# Patient Record
Sex: FEMALE | Race: WHITE | NOT HISPANIC OR LATINO | URBAN - METROPOLITAN AREA
[De-identification: names, ages, dates, MRNs, and addresses within clinical notes are randomized per-mention and may not be internally consistent; named-entity substitution may affect disease eponyms.]

---

## 2021-07-20 ENCOUNTER — EMERGENCY (EMERGENCY)
Facility: HOSPITAL | Age: 71
LOS: 1 days | End: 2021-07-20
Admitting: EMERGENCY MEDICINE
Payer: MEDICARE

## 2021-07-20 PROCEDURE — L9992: CPT

## 2021-08-03 ENCOUNTER — APPOINTMENT (OUTPATIENT)
Dept: MRI IMAGING | Facility: CLINIC | Age: 71
End: 2021-08-03
Payer: MEDICARE

## 2021-08-03 ENCOUNTER — OUTPATIENT (OUTPATIENT)
Dept: OUTPATIENT SERVICES | Facility: HOSPITAL | Age: 71
LOS: 1 days | End: 2021-08-03
Payer: MEDICARE

## 2021-08-03 DIAGNOSIS — D36.10 BENIGN NEOPLASM OF PERIPHERAL NERVES AND AUTONOMIC NERVOUS SYSTEM, UNSPECIFIED: ICD-10-CM

## 2021-08-03 PROCEDURE — 70553 MRI BRAIN STEM W/O & W/DYE: CPT | Mod: 26,MH

## 2021-08-03 PROCEDURE — 70553 MRI BRAIN STEM W/O & W/DYE: CPT | Mod: MH

## 2021-08-03 PROCEDURE — A9585: CPT

## 2021-08-13 ENCOUNTER — OUTPATIENT (OUTPATIENT)
Dept: OUTPATIENT SERVICES | Facility: HOSPITAL | Age: 71
LOS: 1 days | End: 2021-08-13

## 2021-08-18 ENCOUNTER — NON-APPOINTMENT (OUTPATIENT)
Age: 71
End: 2021-08-18

## 2021-08-18 ENCOUNTER — APPOINTMENT (OUTPATIENT)
Dept: CARDIOLOGY | Facility: CLINIC | Age: 71
End: 2021-08-18
Payer: MEDICARE

## 2021-08-18 VITALS
TEMPERATURE: 97.3 F | HEIGHT: 62 IN | DIASTOLIC BLOOD PRESSURE: 70 MMHG | HEART RATE: 61 BPM | SYSTOLIC BLOOD PRESSURE: 120 MMHG | OXYGEN SATURATION: 95 % | WEIGHT: 122 LBS | RESPIRATION RATE: 17 BRPM | BODY MASS INDEX: 22.45 KG/M2

## 2021-08-18 VITALS — DIASTOLIC BLOOD PRESSURE: 72 MMHG | SYSTOLIC BLOOD PRESSURE: 120 MMHG

## 2021-08-18 DIAGNOSIS — Z78.9 OTHER SPECIFIED HEALTH STATUS: ICD-10-CM

## 2021-08-18 DIAGNOSIS — R55 SYNCOPE AND COLLAPSE: ICD-10-CM

## 2021-08-18 DIAGNOSIS — D36.11 BENIGN NEOPLASM OF PERIPHERAL NERVES AND AUTONOMIC NERVOUS SYSTEM OF FACE, HEAD, AND NECK: ICD-10-CM

## 2021-08-18 PROCEDURE — 99204 OFFICE O/P NEW MOD 45 MIN: CPT

## 2021-08-18 PROCEDURE — 93000 ELECTROCARDIOGRAM COMPLETE: CPT

## 2021-08-18 RX ORDER — MULTIVITAMIN
TABLET ORAL
Refills: 0 | Status: ACTIVE | COMMUNITY

## 2021-08-19 NOTE — DISCUSSION/SUMMARY
[FreeTextEntry1] : \par 71 yo F former smoker, schwannoma of cranial nerve and hx as detailed in assessed problem list here for initial outpatient CV evaluation for episode of unresponsiveness with shaking 1 minute witnessed but . She had MRI brain after this which was unremarkable. Follows with NSGY pending neurology input. Unchanged mild dyspnea. \par \par 10 year ASCVD 5.93%.\par \par PLAN: \par - TTE to rule out structural CV abnormality\par - TST\par - carotid duplex\par - pending neurology evaluation\par - no indication for heart monitor at this time\par - recommend PCP or pulm evaluation for LLL wheezing\par - discuss aspirin/statin initiation at follow up visit given intermediate 10 y ascvd % and pending cv testing results\par \par \par Follow up after testing. ER precautions given to patient.\par

## 2021-08-19 NOTE — PHYSICAL EXAM
[Well Developed] : well developed [Well Nourished] : well nourished [No Acute Distress] : no acute distress [Normal Conjunctiva] : normal conjunctiva [Normal Venous Pressure] : normal venous pressure [Carotid Bruit] : carotid bruit [Normal S1, S2] : normal S1, S2 [No Rub] : no rub [No Gallop] : no gallop [No Respiratory Distress] : no respiratory distress  [Soft] : abdomen soft [Non Tender] : non-tender [Normal Bowel Sounds] : normal bowel sounds [Normal Gait] : normal gait [No Edema] : no edema [No Cyanosis] : no cyanosis [No Clubbing] : no clubbing [Venous varicosities] : venous varicosities [No Rash] : no rash [Moves all extremities] : moves all extremities [No Focal Deficits] : no focal deficits [Normal Speech] : normal speech [Alert and Oriented] : alert and oriented [Normal memory] : normal memory [de-identified] : LLL wheezing otherwise clear

## 2021-08-19 NOTE — HISTORY OF PRESENT ILLNESS
[FreeTextEntry1] : \par 71 yo F former smoker, schwannoma of cranial nerve and hx as detailed in assessed problem list here for initial outpatient CV evaluation for episode of unresponsiveness with shaking 1 minute witnessed but . Both explicitly deny any LOC or syncope. She had MRI brain after this which was unremarkable. Follows with NSGY pending neurology input. She had presented to [facility] [place] then left prior to being seen she states due to long wait but did receive IVF.\par \par Never happened prior or after. No palpitations. Active. No angina. Unchanged mild dyspnea. \par \par \par TESTING:\par EKG: sinus bradycardia 55 bpm\par Labwork 8/13/21: grossly normal TFT, ferritin, CBC, iron, A1c, LDL 88.\par MRI brain 8/2021 unremarkable

## 2021-10-25 ENCOUNTER — OUTPATIENT (OUTPATIENT)
Dept: OUTPATIENT SERVICES | Facility: HOSPITAL | Age: 71
LOS: 1 days | End: 2021-10-25

## 2021-11-30 ENCOUNTER — APPOINTMENT (OUTPATIENT)
Dept: MAMMOGRAPHY | Facility: CLINIC | Age: 71
End: 2021-11-30

## 2021-11-30 ENCOUNTER — APPOINTMENT (OUTPATIENT)
Dept: ULTRASOUND IMAGING | Facility: CLINIC | Age: 71
End: 2021-11-30
Payer: MEDICARE

## 2021-11-30 PROCEDURE — 77067 SCR MAMMO BI INCL CAD: CPT

## 2021-11-30 PROCEDURE — 77063 BREAST TOMOSYNTHESIS BI: CPT

## 2021-11-30 PROCEDURE — 76882 US LMTD JT/FCL EVL NVASC XTR: CPT | Mod: RT

## 2021-12-07 ENCOUNTER — APPOINTMENT (OUTPATIENT)
Dept: CARDIOLOGY | Facility: CLINIC | Age: 71
End: 2021-12-07
Payer: MEDICARE

## 2021-12-07 ENCOUNTER — NON-APPOINTMENT (OUTPATIENT)
Age: 71
End: 2021-12-07

## 2021-12-07 PROCEDURE — 93880 EXTRACRANIAL BILAT STUDY: CPT

## 2021-12-07 PROCEDURE — 93015 CV STRESS TEST SUPVJ I&R: CPT

## 2021-12-07 PROCEDURE — 93306 TTE W/DOPPLER COMPLETE: CPT

## 2021-12-20 ENCOUNTER — APPOINTMENT (OUTPATIENT)
Dept: MAMMOGRAPHY | Facility: CLINIC | Age: 71
End: 2021-12-20
Payer: MEDICARE

## 2021-12-20 PROCEDURE — 77065 DX MAMMO INCL CAD UNI: CPT | Mod: RT

## 2021-12-20 PROCEDURE — G0279: CPT | Mod: RT

## 2022-01-05 ENCOUNTER — APPOINTMENT (OUTPATIENT)
Dept: CARDIOLOGY | Facility: CLINIC | Age: 72
End: 2022-01-05
Payer: MEDICARE

## 2022-01-05 VITALS
TEMPERATURE: 97.3 F | WEIGHT: 130 LBS | SYSTOLIC BLOOD PRESSURE: 110 MMHG | HEART RATE: 65 BPM | DIASTOLIC BLOOD PRESSURE: 62 MMHG | HEIGHT: 62 IN | BODY MASS INDEX: 23.92 KG/M2 | OXYGEN SATURATION: 95 %

## 2022-01-05 PROCEDURE — 99214 OFFICE O/P EST MOD 30 MIN: CPT

## 2022-01-05 RX ORDER — CYCLOSPORINE 0.5 MG/ML
0.05 EMULSION OPHTHALMIC
Refills: 0 | Status: ACTIVE | COMMUNITY

## 2022-01-05 RX ORDER — MULTIVIT-MIN/IRON/FOLIC ACID/K 18-600-40
CAPSULE ORAL
Refills: 0 | Status: ACTIVE | COMMUNITY

## 2022-01-05 NOTE — PHYSICAL EXAM
[Well Developed] : well developed [Well Nourished] : well nourished [No Acute Distress] : no acute distress [Normal Conjunctiva] : normal conjunctiva [Normal Venous Pressure] : normal venous pressure [Carotid Bruit] : carotid bruit [Normal S1, S2] : normal S1, S2 [No Rub] : no rub [No Gallop] : no gallop [No Respiratory Distress] : no respiratory distress  [Soft] : abdomen soft [Non Tender] : non-tender [Normal Bowel Sounds] : normal bowel sounds [Normal Gait] : normal gait [No Edema] : no edema [No Cyanosis] : no cyanosis [No Clubbing] : no clubbing [Venous varicosities] : venous varicosities [No Rash] : no rash [Moves all extremities] : moves all extremities [No Focal Deficits] : no focal deficits [Normal Speech] : normal speech [Alert and Oriented] : alert and oriented [Normal memory] : normal memory [Clear Lung Fields] : clear lung fields [de-identified] : 2+ pulses throughout

## 2022-01-05 NOTE — HISTORY OF PRESENT ILLNESS
[FreeTextEntry1] : \par 70 yo F former smoker, schwannoma of cranial nerve and hx as detailed in assessed problem list here for results follow-up. 10-year ASCVD 6%.\par \par INITIAL VISIT 8/18/21: "For episode of unresponsiveness with shaking 1 minute witnessed but . Both explicitly deny any LOC or syncope. She had MRI brain after this which was unremarkable. Follows with NSGY pending neurology input. She had presented to [facility] [place] then left prior to being seen she states due to long wait but did receive IVF. Never happened prior or after. No palpitations. Active. No angina. Unchanged mild dyspnea."\par \par \par INTERIM:\par underwent testing. feeling well.\par \par \par TESTING:\par 12/2021\par Carotid Doppler no significant disease\par Echocardiogram structurally unremarkable.  Preserved biventricular function.  Atrial septal aneurysm.\par Exercise stress testing: 10 METS, excellent.  Rivera score 9.  Equivocal for ischemia by EKG.\par \par EKG: sinus bradycardia 55 bpm\par Labwork 8/13/21: grossly normal TFT, ferritin, CBC, iron, A1c, LDL 88.\par MRI brain 8/2021 unremarkable

## 2022-01-05 NOTE — DISCUSSION/SUMMARY
[FreeTextEntry1] : \par 72 yo F former smoker, schwannoma of cranial nerve and hx as detailed in assessed problem list here for results follow-up.  10-year ASCVD 6%.\par \par PLAN: \par - pending neurology evaluation for 1/12/2022\par - no indication for heart monitor at this time\par - discussed statin initiation at follow up visit given intermediate 10 y ascvd %\par \par \par Follow up in 1 year, forward note to PCP. ER precautions given to patient.\par

## 2022-01-20 ENCOUNTER — APPOINTMENT (OUTPATIENT)
Dept: MAMMOGRAPHY | Facility: CLINIC | Age: 72
End: 2022-01-20

## 2022-01-27 ENCOUNTER — RESULT REVIEW (OUTPATIENT)
Age: 72
End: 2022-01-27

## 2022-01-27 ENCOUNTER — APPOINTMENT (OUTPATIENT)
Dept: MAMMOGRAPHY | Facility: CLINIC | Age: 72
End: 2022-01-27
Payer: MEDICARE

## 2022-01-27 PROCEDURE — 77065 DX MAMMO INCL CAD UNI: CPT | Mod: RT

## 2022-01-27 PROCEDURE — 19081 BX BREAST 1ST LESION STRTCTC: CPT | Mod: RT

## 2023-01-20 ENCOUNTER — APPOINTMENT (OUTPATIENT)
Dept: CARDIOLOGY | Facility: CLINIC | Age: 73
End: 2023-01-20
Payer: MEDICARE

## 2023-01-20 VITALS
DIASTOLIC BLOOD PRESSURE: 76 MMHG | OXYGEN SATURATION: 100 % | HEIGHT: 62 IN | BODY MASS INDEX: 25.4 KG/M2 | HEART RATE: 70 BPM | TEMPERATURE: 97.4 F | WEIGHT: 138 LBS | SYSTOLIC BLOOD PRESSURE: 116 MMHG

## 2023-01-20 PROCEDURE — 93000 ELECTROCARDIOGRAM COMPLETE: CPT

## 2023-01-20 PROCEDURE — 99215 OFFICE O/P EST HI 40 MIN: CPT

## 2023-01-20 RX ORDER — PNV NO.95/FERROUS FUM/FOLIC AC 28MG-0.8MG
TABLET ORAL
Refills: 0 | Status: ACTIVE | COMMUNITY

## 2023-01-20 RX ORDER — ZINC SULFATE 50(220)MG
CAPSULE ORAL
Refills: 0 | Status: ACTIVE | COMMUNITY

## 2023-01-20 RX ORDER — BIOTIN 10 MG
TABLET ORAL
Refills: 0 | Status: ACTIVE | COMMUNITY

## 2023-01-20 RX ORDER — MAGNESIUM 100 MG
100 TABLET ORAL
Refills: 0 | Status: DISCONTINUED | COMMUNITY
End: 2023-01-20

## 2023-01-20 RX ORDER — ASCORBIC ACID 125 MG
TABLET,CHEWABLE ORAL
Refills: 0 | Status: ACTIVE | COMMUNITY

## 2023-01-20 RX ORDER — CINNAMON BARK 500 MG
CAPSULE ORAL
Refills: 0 | Status: ACTIVE | COMMUNITY

## 2023-01-20 NOTE — DISCUSSION/SUMMARY
[FreeTextEntry1] : \par 71 yo F former smoker, schwannoma of cranial nerve. 10-year ASCVD 6%. cavernous malformation/seizure. \par \par - coronary calcium score to risk stratify \par - no indication for heart monitor at this time. but if nighttime symptoms increase in frequency or become concerning, can obtain 7 day zio patch (offered presently). rec home sleep study; she'll consider \par - discuss statin initiation at follow up visit given intermediate 10 y ascvd % pend results \par - labwork ordered\par - continue neurology\par \par \par Follow in 1 year. call with results if unremarkable. ER precautions given to patient.\par

## 2023-01-20 NOTE — HISTORY OF PRESENT ILLNESS
[FreeTextEntry1] : \par 73 yo F former smoker, schwannoma of cranial nerve. 10-year ASCVD 6%. cavernous malformation/seizure. \par \par 1/2023 VISIT: saw neurology. no angina or dyspnea. intermittent nighttime fast heart beat when falling asleep not associated with alarm signs/symptoms. no seizures. \par \par INITIAL VISIT 8/18/21: "For episode of unresponsiveness with shaking 1 minute witnessed but . Both explicitly deny any LOC or syncope. She had MRI brain after this which was unremarkable. Follows with NSGY pending neurology input. She had presented to [facility] [place] then left prior to being seen she states due to long wait but did receive IVF. Never happened prior or after. No palpitations. Active. No angina. Unchanged mild dyspnea."\par \par \par TESTING:\par 12/2021\par Carotid Doppler no significant disease\par Echocardiogram structurally unremarkable.  Preserved biventricular function.  Atrial septal aneurysm.\par Exercise stress testing: 10 METS, excellent.  Rivera score 9.  Equivocal for ischemia by EKG.\par \par EKG: sinus bradycardia 55 bpm\par Labwork 8/13/21: grossly normal TFT, ferritin, CBC, iron, A1c, LDL 88.\par MRI brain 8/2021 unremarkable

## 2023-01-20 NOTE — PHYSICAL EXAM
[Well Developed] : well developed [Well Nourished] : well nourished [No Acute Distress] : no acute distress [Normal Conjunctiva] : normal conjunctiva [Normal Venous Pressure] : normal venous pressure [Carotid Bruit] : carotid bruit [Normal S1, S2] : normal S1, S2 [No Rub] : no rub [No Gallop] : no gallop [Clear Lung Fields] : clear lung fields [No Respiratory Distress] : no respiratory distress  [Soft] : abdomen soft [Non Tender] : non-tender [Normal Bowel Sounds] : normal bowel sounds [Normal Gait] : normal gait [No Edema] : no edema [No Cyanosis] : no cyanosis [No Clubbing] : no clubbing [Venous varicosities] : venous varicosities [No Rash] : no rash [Moves all extremities] : moves all extremities [No Focal Deficits] : no focal deficits [Normal Speech] : normal speech [Alert and Oriented] : alert and oriented [Normal memory] : normal memory [de-identified] : 2+ pulses throughout

## 2024-01-22 ENCOUNTER — APPOINTMENT (OUTPATIENT)
Dept: MRI IMAGING | Facility: CLINIC | Age: 74
End: 2024-01-22
Payer: MEDICARE

## 2024-01-22 ENCOUNTER — OUTPATIENT (OUTPATIENT)
Dept: OUTPATIENT SERVICES | Facility: HOSPITAL | Age: 74
LOS: 1 days | End: 2024-01-22
Payer: MEDICARE

## 2024-01-22 DIAGNOSIS — Z00.8 ENCOUNTER FOR OTHER GENERAL EXAMINATION: ICD-10-CM

## 2024-01-22 PROCEDURE — 70553 MRI BRAIN STEM W/O & W/DYE: CPT | Mod: MH

## 2024-01-22 PROCEDURE — 70553 MRI BRAIN STEM W/O & W/DYE: CPT | Mod: 26,MH

## 2024-02-07 ENCOUNTER — NON-APPOINTMENT (OUTPATIENT)
Age: 74
End: 2024-02-07

## 2024-02-07 ENCOUNTER — APPOINTMENT (OUTPATIENT)
Dept: CARDIOLOGY | Facility: CLINIC | Age: 74
End: 2024-02-07
Payer: MEDICARE

## 2024-02-07 VITALS
HEART RATE: 76 BPM | WEIGHT: 137 LBS | HEIGHT: 62 IN | DIASTOLIC BLOOD PRESSURE: 68 MMHG | OXYGEN SATURATION: 98 % | SYSTOLIC BLOOD PRESSURE: 118 MMHG | BODY MASS INDEX: 25.21 KG/M2

## 2024-02-07 PROCEDURE — 93000 ELECTROCARDIOGRAM COMPLETE: CPT

## 2024-02-07 PROCEDURE — 99214 OFFICE O/P EST MOD 30 MIN: CPT

## 2024-02-07 NOTE — HISTORY OF PRESENT ILLNESS
[FreeTextEntry1] : 74 yo F here for routine cv care. She has history of benign palpitations, former smoker, schwannoma of cranial nerve. 10-year ASCVD 6%. cavernous malformation/seizure.   2/2024 VISIT: varicose veins. did not get the ct calcium score yet unfortunately. no active cv symptoms.   1/2023 VISIT: saw neurology. no angina or dyspnea. intermittent nighttime fast heart beat when falling asleep not associated with alarm signs/symptoms. no seizures.   INITIAL VISIT 8/18/21: "For episode of unresponsiveness with shaking 1 minute witnessed but . Both explicitly deny any LOC or syncope. She had MRI brain after this which was unremarkable. Follows with NSGY pending neurology input. She had presented to [facility] [place] then left prior to being seen she states due to long wait but did receive IVF. Never happened prior or after. No palpitations. Active. No angina. Unchanged mild dyspnea."   TESTING I REVIEWED TODAY excluding above:  12/2021 Carotid Doppler no significant disease Echocardiogram structurally unremarkable.  Preserved biventricular function.  Atrial septal aneurysm. Exercise stress testing: 10 METS, excellent.  Rivera score 9.  Equivocal for ischemia by EKG.  EKG: sinus bradycardia 55 bpm Labwork 8/13/21: grossly normal TFT, ferritin, CBC, iron, A1c, LDL 88. MRI brain 8/2021 unremarkable

## 2024-02-07 NOTE — REASON FOR VISIT
[Symptom and Test Evaluation] : symptom and test evaluation [CV Risk Factors and Non-Cardiac Disease] : CV risk factors and non-cardiac disease [FreeTextEntry3] : Dr. Guillermina Linares

## 2024-02-07 NOTE — PHYSICAL EXAM
[Well Developed] : well developed [Well Nourished] : well nourished [No Acute Distress] : no acute distress [Normal Conjunctiva] : normal conjunctiva [Normal Venous Pressure] : normal venous pressure [Carotid Bruit] : carotid bruit [Normal S1, S2] : normal S1, S2 [No Rub] : no rub [No Gallop] : no gallop [Clear Lung Fields] : clear lung fields [No Respiratory Distress] : no respiratory distress  [Soft] : abdomen soft [Non Tender] : non-tender [Normal Bowel Sounds] : normal bowel sounds [Normal Gait] : normal gait [No Edema] : no edema [No Clubbing] : no clubbing [No Cyanosis] : no cyanosis [Venous varicosities] : venous varicosities [No Rash] : no rash [Moves all extremities] : moves all extremities [No Focal Deficits] : no focal deficits [Normal Speech] : normal speech [Alert and Oriented] : alert and oriented [Normal memory] : normal memory [de-identified] : 2+ pulses throughout

## 2024-02-07 NOTE — DISCUSSION/SUMMARY
[FreeTextEntry1] : 74 yo F here for routine cv care. She has history of benign palpitations, former smoker, schwannoma of cranial nerve. 10-year ASCVD 6%. cavernous malformation/seizure.   Still pending further risk stratification with coronary calcium score. SHe will get it.    There is no indication for rhythm monitoring at this time. but if nighttime symptoms increase in frequency or become concerning, can obtain 7 day zio patch. recommend home sleep study; she'll consider.   **** discuss statin initiation at follow up visit given intermediate 10 y ascvd % pend results. getting full panel of bloodwork.   **** Varicose veins: refer to venous specialist. Elevate LE, lower Na, avoid hinging, compression stockings, ambulate frequently.  Follow after testing. ER precautions given to patient.

## 2024-02-09 LAB — HBA1C MFR BLD HPLC: 5.3

## 2024-03-30 LAB — HBA1C MFR BLD HPLC: 5.8

## 2024-04-01 ENCOUNTER — APPOINTMENT (OUTPATIENT)
Dept: CARDIOLOGY | Facility: CLINIC | Age: 74
End: 2024-04-01

## 2024-04-01 VITALS
DIASTOLIC BLOOD PRESSURE: 68 MMHG | HEIGHT: 62 IN | OXYGEN SATURATION: 97 % | SYSTOLIC BLOOD PRESSURE: 124 MMHG | BODY MASS INDEX: 25.03 KG/M2 | HEART RATE: 61 BPM | WEIGHT: 136 LBS

## 2024-04-01 DIAGNOSIS — D18.03 HEMANGIOMA OF INTRA-ABDOMINAL STRUCTURES: ICD-10-CM

## 2024-04-01 PROCEDURE — 99215 OFFICE O/P EST HI 40 MIN: CPT

## 2024-04-01 NOTE — PHYSICAL EXAM
[Well Developed] : well developed [Well Nourished] : well nourished [No Acute Distress] : no acute distress [Carotid Bruit] : carotid bruit [Normal Venous Pressure] : normal venous pressure [Normal Conjunctiva] : normal conjunctiva [No Rub] : no rub [Normal S1, S2] : normal S1, S2 [No Gallop] : no gallop [Clear Lung Fields] : clear lung fields [No Respiratory Distress] : no respiratory distress  [Non Tender] : non-tender [Soft] : abdomen soft [Normal Gait] : normal gait [Normal Bowel Sounds] : normal bowel sounds [No Cyanosis] : no cyanosis [No Edema] : no edema [No Clubbing] : no clubbing [Venous varicosities] : venous varicosities [No Rash] : no rash [No Focal Deficits] : no focal deficits [Moves all extremities] : moves all extremities [Alert and Oriented] : alert and oriented [Normal Speech] : normal speech [Normal memory] : normal memory [de-identified] : 2+ pulses throughout

## 2024-04-01 NOTE — HISTORY OF PRESENT ILLNESS
[FreeTextEntry1] : 74 yo F here for routine cv care. She has history of benign palpitations, former smoker, schwannoma of cranial nerve. 10-year ASCVD 6%. cavernous malformation/seizure, predm2.   4/2024 VISIT: 0 coronary calcium score. . a1c 5.8. CT showed hepatic attenuation. +palpitations.    2/2024 VISIT: varicose veins. did not get the ct calcium score yet unfortunately. no active cv symptoms.   1/2023 VISIT: saw neurology. no angina or dyspnea. intermittent nighttime fast heart beat when falling asleep not associated with alarm signs/symptoms. no seizures.   INITIAL VISIT 8/18/21: "For episode of unresponsiveness with shaking 1 minute witnessed but . Both explicitly deny any LOC or syncope. She had MRI brain after this which was unremarkable. Follows with NSGY pending neurology input. She had presented to [facility] [place] then left prior to being seen she states due to long wait but did receive IVF. Never happened prior or after. No palpitations. Active. No angina. Unchanged mild dyspnea."  TESTING I REVIEWED TODAY excluding above:  12/2021 Carotid Doppler no significant disease Echocardiogram structurally unremarkable.  Preserved biventricular function.  Atrial septal aneurysm. Exercise stress testing: 10 METS, excellent.  Rivera score 9.  Equivocal for ischemia by EKG.  EKG: sinus bradycardia 55 bpm Labwork 8/13/21: grossly normal TFT, ferritin, CBC, iron, A1c, LDL 88. MRI brain 8/2021 unremarkable

## 2024-04-01 NOTE — DISCUSSION/SUMMARY
[FreeTextEntry1] : **ordered home sleep study and rhythm monitor.  74 yo F here for routine cv care. She has history of benign palpitations, former smoker, schwannoma of cranial nerve. 10-year ASCVD 6%. cavernous malformation/seizure, predm2.   She has a very low risk coronary calcium CT of score 0. We will hold on pharmacologic lipid lowering therapy for now and monitor her LDL. It has increased from 88 to 105 unfortunately and she has a borderline A1c 5.8. She will improve diet and recheck.    - We will obtain rhythm monitoring given nighttime symptoms, 14 day. recommend home sleep study given fatigue/palpitations/snoring.   I referred her to venous specialist for varicose veins. I recommend she elevate LE, lower Na, avoid hinging, compression stockings, ambulate frequently.  Work up of hepatic hypoattenuation by PCP was done and showed hepatic hemangioma.   Follow after testing. ER precautions given to patient.

## 2024-04-18 ENCOUNTER — NON-APPOINTMENT (OUTPATIENT)
Age: 74
End: 2024-04-18

## 2024-05-15 PROBLEM — Z87.891 FORMER SMOKER: Status: ACTIVE | Noted: 2021-08-18

## 2024-05-15 PROBLEM — I95.9 HYPOTENSION: Status: ACTIVE | Noted: 2021-08-18

## 2024-05-15 PROBLEM — R06.00 DYSPNEA: Status: ACTIVE | Noted: 2021-08-18

## 2024-05-15 PROBLEM — R00.2 HEART PALPITATIONS: Status: ACTIVE | Noted: 2023-01-20

## 2024-05-15 PROBLEM — R42 DIZZINESS: Status: ACTIVE | Noted: 2021-08-18

## 2024-05-17 ENCOUNTER — APPOINTMENT (OUTPATIENT)
Dept: CARDIOLOGY | Facility: CLINIC | Age: 74
End: 2024-05-17
Payer: MEDICARE

## 2024-05-17 VITALS
SYSTOLIC BLOOD PRESSURE: 114 MMHG | WEIGHT: 138 LBS | HEART RATE: 51 BPM | OXYGEN SATURATION: 95 % | DIASTOLIC BLOOD PRESSURE: 74 MMHG | BODY MASS INDEX: 25.24 KG/M2

## 2024-05-17 DIAGNOSIS — Z87.891 PERSONAL HISTORY OF NICOTINE DEPENDENCE: ICD-10-CM

## 2024-05-17 DIAGNOSIS — R51.9 HEADACHE, UNSPECIFIED: ICD-10-CM

## 2024-05-17 DIAGNOSIS — R42 DIZZINESS AND GIDDINESS: ICD-10-CM

## 2024-05-17 DIAGNOSIS — I95.9 HYPOTENSION, UNSPECIFIED: ICD-10-CM

## 2024-05-17 DIAGNOSIS — R06.00 DYSPNEA, UNSPECIFIED: ICD-10-CM

## 2024-05-17 DIAGNOSIS — R00.2 PALPITATIONS: ICD-10-CM

## 2024-05-17 PROCEDURE — 99214 OFFICE O/P EST MOD 30 MIN: CPT

## 2024-05-17 NOTE — REASON FOR VISIT
[Symptom and Test Evaluation] : symptom and test evaluation [CV Risk Factors and Non-Cardiac Disease] : CV risk factors and non-cardiac disease [FreeTextEntry3] : Dr. Guillermina Linares, Dr. Charanjit Loya

## 2024-05-17 NOTE — PHYSICAL EXAM
[Well Developed] : well developed [Well Nourished] : well nourished [No Acute Distress] : no acute distress [Normal Conjunctiva] : normal conjunctiva [Normal Venous Pressure] : normal venous pressure [Carotid Bruit] : carotid bruit [Normal S1, S2] : normal S1, S2 [No Rub] : no rub [No Gallop] : no gallop [Clear Lung Fields] : clear lung fields [No Respiratory Distress] : no respiratory distress  [Soft] : abdomen soft [Non Tender] : non-tender [Normal Bowel Sounds] : normal bowel sounds [Normal Gait] : normal gait [No Edema] : no edema [No Cyanosis] : no cyanosis [No Clubbing] : no clubbing [Venous varicosities] : venous varicosities [No Rash] : no rash [Moves all extremities] : moves all extremities [No Focal Deficits] : no focal deficits [Normal Speech] : normal speech [Alert and Oriented] : alert and oriented [Normal memory] : normal memory [de-identified] : 2+ pulses throughout

## 2024-05-17 NOTE — DISCUSSION/SUMMARY
[FreeTextEntry1] : 74 yo F here for routine cv care. She has history of benign palpitations, former smoker, schwannoma of cranial nerve. 10-year ASCVD 6%. cavernous malformation/seizure, predm2.   She has a very low risk coronary calcium CT of score 0. We will hold on pharmacologic lipid lowering therapy for now and monitor her LDL. It has increased from 88 to 105 unfortunately and she has a borderline A1c 5.8. She will improve diet and recheck.    Her rhythm monitors are very benign. Recommend home sleep study given fatigue/palpitations/snoring.   I referred her to venous specialist for varicose veins. I recommend she elevate LE, lower Na, avoid hinging, compression stockings, ambulate frequently.  Work up of hepatic hypoattenuation by PCP was done and showed hepatic hemangioma.   Follow in 6 months with labwork. ER precautions given to patient.

## 2024-05-17 NOTE — HISTORY OF PRESENT ILLNESS
[FreeTextEntry1] : 72 yo F here for routine cv care. She has history of benign palpitations, former smoker, schwannoma of cranial nerve. 10-year ASCVD 6%. cavernous malformation/seizure, predm2.   5/2024 VISIT: benign rhythm monitor. did not obtain sleep study yet. still having intermittent palpitations that do not correlate with arrythmia!   4/2024 VISIT: 0 coronary calcium score. . a1c 5.8. CT showed hepatic attenuation. +palpitations.    2/2024 VISIT: varicose veins. did not get the ct calcium score yet unfortunately. no active cv symptoms.   1/2023 VISIT: saw neurology. no angina or dyspnea. intermittent nighttime fast heart beat when falling asleep not associated with alarm signs/symptoms. no seizures.   INITIAL VISIT 8/18/21: "For episode of unresponsiveness with shaking 1 minute witnessed but . Both explicitly deny any LOC or syncope. She had MRI brain after this which was unremarkable. Follows with NSGY pending neurology input. She had presented to [facility] [place] then left prior to being seen she states due to long wait but did receive IVF. Never happened prior or after. No palpitations. Active. No angina. Unchanged mild dyspnea."  **TESTING I REVIEWED TODAY excluding above:  12/2021 Carotid Doppler no significant disease Echocardiogram structurally unremarkable.  Preserved biventricular function.  Atrial septal aneurysm. Exercise stress testing: 10 METS, excellent.  Rivera score 9.  Equivocal for ischemia by EKG.  EKG: sinus bradycardia 55 bpm Labwork 8/13/21: grossly normal TFT, ferritin, CBC, iron, A1c, LDL 88. MRI brain 8/2021 unremarkable

## 2024-05-28 ENCOUNTER — APPOINTMENT (OUTPATIENT)
Dept: MAMMOGRAPHY | Facility: CLINIC | Age: 74
End: 2024-05-28
Payer: MEDICARE

## 2024-05-28 PROCEDURE — 77063 BREAST TOMOSYNTHESIS BI: CPT

## 2024-05-28 PROCEDURE — 77067 SCR MAMMO BI INCL CAD: CPT

## 2024-11-01 ENCOUNTER — APPOINTMENT (OUTPATIENT)
Dept: CARDIOLOGY | Facility: CLINIC | Age: 74
End: 2024-11-01
Payer: MEDICARE

## 2024-11-01 VITALS
BODY MASS INDEX: 25.97 KG/M2 | WEIGHT: 142 LBS | DIASTOLIC BLOOD PRESSURE: 62 MMHG | OXYGEN SATURATION: 97 % | SYSTOLIC BLOOD PRESSURE: 104 MMHG | HEART RATE: 69 BPM

## 2024-11-01 DIAGNOSIS — Z87.891 PERSONAL HISTORY OF NICOTINE DEPENDENCE: ICD-10-CM

## 2024-11-01 DIAGNOSIS — R00.2 PALPITATIONS: ICD-10-CM

## 2024-11-01 DIAGNOSIS — I95.9 HYPOTENSION, UNSPECIFIED: ICD-10-CM

## 2024-11-01 DIAGNOSIS — R42 DIZZINESS AND GIDDINESS: ICD-10-CM

## 2024-11-01 DIAGNOSIS — R06.00 DYSPNEA, UNSPECIFIED: ICD-10-CM

## 2024-11-01 PROCEDURE — 99214 OFFICE O/P EST MOD 30 MIN: CPT

## 2024-12-16 ENCOUNTER — APPOINTMENT (OUTPATIENT)
Dept: NEUROLOGY | Facility: CLINIC | Age: 74
End: 2024-12-16
Payer: MEDICARE

## 2024-12-16 PROCEDURE — 96133 NRPSYC TST EVAL PHYS/QHP EA: CPT

## 2024-12-16 PROCEDURE — 96139 PSYCL/NRPSYC TST TECH EA: CPT | Mod: NC

## 2024-12-16 PROCEDURE — 96138 PSYCL/NRPSYC TECH 1ST: CPT | Mod: NC

## 2024-12-16 PROCEDURE — 96116 NUBHVL XM PHYS/QHP 1ST HR: CPT

## 2024-12-16 PROCEDURE — 96132 NRPSYC TST EVAL PHYS/QHP 1ST: CPT

## 2025-01-17 ENCOUNTER — APPOINTMENT (OUTPATIENT)
Dept: NEUROLOGY | Facility: CLINIC | Age: 75
End: 2025-01-17

## 2025-01-17 PROCEDURE — 96133 NRPSYC TST EVAL PHYS/QHP EA: CPT

## 2025-06-17 ENCOUNTER — APPOINTMENT (OUTPATIENT)
Dept: CARDIOLOGY | Facility: CLINIC | Age: 75
End: 2025-06-17
Payer: MEDICARE

## 2025-06-17 VITALS
BODY MASS INDEX: 26.52 KG/M2 | OXYGEN SATURATION: 97 % | HEART RATE: 60 BPM | DIASTOLIC BLOOD PRESSURE: 66 MMHG | SYSTOLIC BLOOD PRESSURE: 106 MMHG | WEIGHT: 145 LBS

## 2025-06-17 PROCEDURE — 99214 OFFICE O/P EST MOD 30 MIN: CPT

## 2025-06-17 PROCEDURE — 99204 OFFICE O/P NEW MOD 45 MIN: CPT

## 2025-06-17 PROCEDURE — 93000 ELECTROCARDIOGRAM COMPLETE: CPT

## 2025-06-30 ENCOUNTER — APPOINTMENT (OUTPATIENT)
Dept: NUCLEAR MEDICINE | Facility: CLINIC | Age: 75
End: 2025-06-30

## 2025-06-30 PROCEDURE — A9552: CPT

## 2025-06-30 PROCEDURE — 78608 BRAIN IMAGING (PET): CPT

## 2025-06-30 PROCEDURE — 78999 UNLISTED MISC PX DX NUC MED: CPT

## 2025-09-03 ENCOUNTER — APPOINTMENT (OUTPATIENT)
Dept: RADIOLOGY | Facility: CLINIC | Age: 75
End: 2025-09-03
Payer: MEDICARE

## 2025-09-03 ENCOUNTER — APPOINTMENT (OUTPATIENT)
Dept: MAMMOGRAPHY | Facility: CLINIC | Age: 75
End: 2025-09-03
Payer: MEDICARE

## 2025-09-03 PROCEDURE — 77063 BREAST TOMOSYNTHESIS BI: CPT | Mod: 26

## 2025-09-03 PROCEDURE — 77080 DXA BONE DENSITY AXIAL: CPT

## 2025-09-03 PROCEDURE — 77067 SCR MAMMO BI INCL CAD: CPT | Mod: TC
